# Patient Record
Sex: MALE | Race: WHITE | Employment: FULL TIME | URBAN - METROPOLITAN AREA
[De-identification: names, ages, dates, MRNs, and addresses within clinical notes are randomized per-mention and may not be internally consistent; named-entity substitution may affect disease eponyms.]

---

## 2019-01-31 NOTE — PROGRESS NOTES
SUBJECTIVE:   CC: Tyler Gonzalez Sr. is an 49 year old male who presents for preventive health visit.     Healthy Habits:    Do you get at least three servings of calcium containing foods daily (dairy, green leafy vegetables, etc.)? yes    Amount of exercise or daily activities, outside of work: 0 day(s) per week    Problems taking medications regularly No    Medication side effects: No    Have you had an eye exam in the past two years? yes    Do you see a dentist twice per year? yes    Do you have sleep apnea, excessive snoring or daytime drowsiness?no      Patient recently moved from [place].  Currently living in [place] with his girlfriend.  Patient has multiple health issues including anxiety and feeling of panic.  Apparently he had some anal fistula surgery done which cause tremendous amount of pain and resulted in  some complication including abcess which made him a little more cautious and anxious about medical health issues.    He is currently taking Paxil 5 mg but sometimes he feels a little head congested.    He would like to see if there is any different medication can be used along with Paxil or apart from Paxil.  Chronic neck issues for the last 30 years.  He has seen pain clinic in the past was given pain medication but he has not taken them recently and he is not planning to take pain medications.  He has done some trigger injections  And steroid Injections which in the past has helped.  Overall his most anxiety is due to some complications of medical issues.  He gets nervous and anxious talking about his medical health.  Complains of deviated nasal septum which caused some nasal congestion.  He is planning to see ENT would like to get a referral.  Intermittent asthma which is slight worse in the last 2-3 weeks due to cold.  Using his albuterol more frequently.  No current drug use but in the past he has a history of cocaine use.  He has also used marijuana but not currently using any  "drugs.  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      Today's PHQ-2 Score:   PHQ-2 ( 1999 [employer]) 2/1/2019   Q1: Little interest or pleasure in doing things 1   Q2: Feeling down, depressed or hopeless 1   PHQ-2 Score 2       Abuse: Current or Past(Physical, Sexual or Emotional)- No  Do you feel safe in your environment? Yes    Social History     Tobacco Use     Smoking status: Current Every Day Smoker     Packs/day: 1.00     Years: 20.00     Pack years: 20.00     Types: Cigarettes     Smokeless tobacco: Never Used   Substance Use Topics     Alcohol use: Yes     Frequency: 2-4 times a month     If you drink alcohol do you typically have >3 drinks per day or >7 drinks per week? No                      Last PSA: No results found for: PSA    Reviewed orders with patient. Reviewed health maintenance and updated orders accordingly - No      Reviewed and updated as needed this visit by clinical staff  Tobacco  Allergies  Meds  Fam Hx  Soc Hx        Reviewed and updated as needed this visit by Provider            ROS:  CONSTITUTIONAL: NEGATIVE for fever, chills, change in weight  INTEGUMENTARY/SKIN: NEGATIVE for worrisome rashes, moles or lesions  EYES: NEGATIVE for vision changes or irritation  ENT: NEGATIVE for ear, mouth and throat problems  RESP: NEGATIVE for significant cough or SOB  CV: NEGATIVE for chest pain, palpitations or peripheral edema  GI: NEGATIVE for nausea, abdominal pain, heartburn, or change in bowel habits   male: negative for dysuria, hematuria, decreased urinary stream, erectile dysfunction, urethral discharge  MUSCULOSKELETAL:NEGATIVE for significant arthralgias or myalgia and POSITIVE  for arthralgias neck  NEURO: NEGATIVE for weakness, dizziness or paresthesias  PSYCHIATRIC: HX anxiety    OBJECTIVE:   /70   Pulse 80   Temp 97  F (36.1  C) (Tympanic)   Ht 1.865 m (6' 1.43\")   Wt 94.8 kg (209 lb)   BMI 27.26 kg/m    EXAM:  GENERAL: healthy, alert and no " distress  EYES: Eyes grossly normal to inspection, PERRL and conjunctivae and sclerae normal  HENT: ear canals and TM's normal, nose and mouth without ulcers or lesions  NECK: no adenopathy, no asymmetry, masses, or scars and thyroid normal to palpation  RESP: lungs clear to auscultation - no rales, rhonchi or wheezes  CV: regular rate and rhythm, normal S1 S2, no S3 or S4, no murmur, click or rub, no peripheral edema and peripheral pulses strong  ABDOMEN: soft, nontender, no hepatosplenomegaly, no masses and bowel sounds normal  MS: no gross musculoskeletal defects noted, no edema  SKIN: no suspicious lesions or rashes  NEURO: Normal strength and tone, mentation intact and speech normal  PSYCH: mentation appears normal, affect normal/bright  PSYCH: mentation appears normal, affect normal/bright and appears anxious        ASSESSMENT/PLAN:   1. Encounter for annual physical exam  Chronic neck issues were discussed briefly today.  I would suggest to follow-up and will make any recommendations including physical therapy versus pain clinic for known pain medication modalities which can be used.  He has some success with trigger point injections.  - HIV Screening  - Lipid panel reflex to direct LDL Fasting  - GASTROENTEROLOGY ADULT REF PROCEDURE ONLY [facility] [phone]; No Provider Preference  - Comprehensive metabolic panel  - Prostate spec antigen screen    2. Screen for colon cancer    - [facility]OLOG[facility] ADULT [facility]F [facility]ED[facility] ONLY [facility] [phone]; No Provider Preference    3. Screening for prostate cancer    - Prostate spec antigen screen    4. Screening for HIV (human immunodeficiency virus)    - HIV Screening    5. Hyperlipidemia LDL goal <160    - Lipid panel reflex to direct LDL Fasting  - Comprehensive metabolic panel    6. Mild intermittent asthma without complication  Asthma control is not well at this time.  I suggested to use albuterol on a regular basis if this continues we  "need to discuss adding steroid inhaler.  - albuterol (PROAIR HFA/PROVENTIL HFA/VENTOLIN HFA) 108 (90 Base) MCG/ACT inhaler; Inhale 2 puffs into the lungs as needed for shortness of breath / dyspnea or wheezing  Dispense: 1 Inhaler; Refill: 8    7. Deviated nasal septum  Complains of nasal congestion and history of deviated nasal septum.  I would suggest to see ENT for evaluation.  - OTOLARYNGOLOGY REFERRAL    8. DENISHA (generalized anxiety disorder)  Patient has severe generalized anxiety issues along with fever of medical issues.  He does not like Paxil however he is taking very low-dose.  I discussed he can take Paxil every other day for a week and start taking Wellbutrin during that time.  We will see if the Wellbutrin helps.  Follow-up in 6 weeks for recheck  - PARoxetine (PAXIL) 10 MG tablet; Take 0.5 tablets (5 mg) by mouth daily  Dispense: 30 tablet; Refill: 0  - buPROPion (WELLBUTRIN XL) 150 MG 24 hr tablet; Take 1 tablet (150 mg) by mouth every morning  Dispense: 30 tablet; Refill: 3    COUNSELING:  Reviewed preventive health counseling, as reflected in patient instructions       Regular exercise       Healthy diet/nutrition    BP Readings from Last 1 Encounters:   02/01/19 110/70     Estimated body mass index is 27.26 kg/m  as calculated from the following:    Height as of this encounter: 1.865 m (6' 1.43\").    Weight as of this encounter: 94.8 kg (209 lb).           reports that he has been smoking cigarettes.  He has a 20.00 pack-year smoking history. he has never used smokeless tobacco.      Counseling Resources:  ATP IV Guidelines  Pooled Cohorts Equation Calculator  FRAX Risk Assessment  ICSI Preventive Guidelines  Dietary Guidelines for Americans, 2010  USDA's MyPlate  ASA Prophylaxis  Lung CA Screening    Boubacar Matias MD  [facility]  "

## 2019-02-01 ENCOUNTER — OFFICE VISIT (OUTPATIENT)
Dept: FAMILY MEDICINE | Facility: CLINIC | Age: 49
End: 2019-02-01
Payer: COMMERCIAL

## 2019-02-01 VITALS
HEART RATE: 80 BPM | BODY MASS INDEX: 27.7 KG/M2 | WEIGHT: 209 LBS | SYSTOLIC BLOOD PRESSURE: 110 MMHG | TEMPERATURE: 97 F | DIASTOLIC BLOOD PRESSURE: 70 MMHG | HEIGHT: 73 IN

## 2019-02-01 DIAGNOSIS — G89.29 CHRONIC NECK PAIN: ICD-10-CM

## 2019-02-01 DIAGNOSIS — F41.1 GAD (GENERALIZED ANXIETY DISORDER): ICD-10-CM

## 2019-02-01 DIAGNOSIS — M54.2 CHRONIC NECK PAIN: ICD-10-CM

## 2019-02-01 DIAGNOSIS — Z12.5 SCREENING FOR PROSTATE CANCER: ICD-10-CM

## 2019-02-01 DIAGNOSIS — Z12.11 SCREEN FOR COLON CANCER: ICD-10-CM

## 2019-02-01 DIAGNOSIS — J34.2 DEVIATED NASAL SEPTUM: ICD-10-CM

## 2019-02-01 DIAGNOSIS — E78.5 HYPERLIPIDEMIA LDL GOAL <160: ICD-10-CM

## 2019-02-01 DIAGNOSIS — Z00.00 ENCOUNTER FOR ANNUAL PHYSICAL EXAM: Primary | ICD-10-CM

## 2019-02-01 DIAGNOSIS — J45.20 MILD INTERMITTENT ASTHMA WITHOUT COMPLICATION: ICD-10-CM

## 2019-02-01 DIAGNOSIS — Z11.4 SCREENING FOR HIV (HUMAN IMMUNODEFICIENCY VIRUS): ICD-10-CM

## 2019-02-01 LAB
ALBUMIN SERPL-MCNC: 3.8 G/DL (ref 3.4–5)
ALP SERPL-CCNC: 56 U/L (ref 40–150)
ALT SERPL W P-5'-P-CCNC: 31 U/L (ref 0–70)
ANION GAP SERPL CALCULATED.3IONS-SCNC: 6 MMOL/L (ref 3–14)
AST SERPL W P-5'-P-CCNC: 23 U/L (ref 0–45)
BILIRUB SERPL-MCNC: 0.4 MG/DL (ref 0.2–1.3)
BUN SERPL-MCNC: 15 MG/DL (ref 7–30)
CALCIUM SERPL-MCNC: 9 MG/DL (ref 8.5–10.1)
CHLORIDE SERPL-SCNC: 105 MMOL/L (ref 94–109)
CHOLEST SERPL-MCNC: 169 MG/DL
CO2 SERPL-SCNC: 26 MMOL/L (ref 20–32)
CREAT SERPL-MCNC: 0.93 MG/DL (ref 0.66–1.25)
GFR SERPL CREATININE-BSD FRML MDRD: >90 ML/MIN/{1.73_M2}
GLUCOSE SERPL-MCNC: 96 MG/DL (ref 70–99)
HDLC SERPL-MCNC: 32 MG/DL
HIV 1+2 AB+HIV1 P24 AG SERPL QL IA: NONREACTIVE
LDLC SERPL CALC-MCNC: 114 MG/DL
NONHDLC SERPL-MCNC: 137 MG/DL
POTASSIUM SERPL-SCNC: 4.3 MMOL/L (ref 3.4–5.3)
PROT SERPL-MCNC: 7.3 G/DL (ref 6.8–8.8)
PSA SERPL-ACNC: 0.43 UG/L (ref 0–4)
SODIUM SERPL-SCNC: 137 MMOL/L (ref 133–144)
TRIGL SERPL-MCNC: 114 MG/DL

## 2019-02-01 PROCEDURE — 87389 HIV-1 AG W/HIV-1&-2 AB AG IA: CPT | Performed by: FAMILY MEDICINE

## 2019-02-01 PROCEDURE — 36415 COLL VENOUS BLD VENIPUNCTURE: CPT | Performed by: FAMILY MEDICINE

## 2019-02-01 PROCEDURE — 99386 PREV VISIT NEW AGE 40-64: CPT | Performed by: FAMILY MEDICINE

## 2019-02-01 PROCEDURE — 80061 LIPID PANEL: CPT | Performed by: FAMILY MEDICINE

## 2019-02-01 PROCEDURE — 99213 OFFICE O/P EST LOW 20 MIN: CPT | Mod: 25 | Performed by: FAMILY MEDICINE

## 2019-02-01 PROCEDURE — G0103 PSA SCREENING: HCPCS | Performed by: FAMILY MEDICINE

## 2019-02-01 PROCEDURE — 80053 COMPREHEN METABOLIC PANEL: CPT | Performed by: FAMILY MEDICINE

## 2019-02-01 RX ORDER — ALBUTEROL SULFATE 90 UG/1
2 AEROSOL, METERED RESPIRATORY (INHALATION) PRN
COMMUNITY
End: 2019-02-01

## 2019-02-01 RX ORDER — BUPROPION HYDROCHLORIDE 150 MG/1
150 TABLET ORAL EVERY MORNING
Qty: 30 TABLET | Refills: 3 | Status: ON HOLD | OUTPATIENT
Start: 2019-02-01 | End: 2019-02-28

## 2019-02-01 RX ORDER — ALBUTEROL SULFATE 90 UG/1
2 AEROSOL, METERED RESPIRATORY (INHALATION) PRN
Qty: 1 INHALER | Refills: 8 | Status: SHIPPED | OUTPATIENT
Start: 2019-02-01 | End: 2020-03-16

## 2019-02-01 RX ORDER — PAROXETINE 10 MG/1
5 TABLET, FILM COATED ORAL DAILY
COMMUNITY
End: 2019-02-01

## 2019-02-01 RX ORDER — PAROXETINE 10 MG/1
5 TABLET, FILM COATED ORAL DAILY
Qty: 30 TABLET | Refills: 0 | Status: SHIPPED | OUTPATIENT
Start: 2019-02-01 | End: 2019-03-15

## 2019-02-01 SDOH — HEALTH STABILITY: MENTAL HEALTH: HOW OFTEN DO YOU HAVE A DRINK CONTAINING ALCOHOL?: 2-4 TIMES A MONTH

## 2019-02-01 ASSESSMENT — ANXIETY QUESTIONNAIRES
IF YOU CHECKED OFF ANY PROBLEMS ON THIS QUESTIONNAIRE, HOW DIFFICULT HAVE THESE PROBLEMS MADE IT FOR YOU TO DO YOUR WORK, TAKE CARE OF THINGS AT HOME, OR GET ALONG WITH OTHER PEOPLE: SOMEWHAT DIFFICULT
3. WORRYING TOO MUCH ABOUT DIFFERENT THINGS: SEVERAL DAYS
6. BECOMING EASILY ANNOYED OR IRRITABLE: SEVERAL DAYS
1. FEELING NERVOUS, ANXIOUS, OR ON EDGE: SEVERAL DAYS
2. NOT BEING ABLE TO STOP OR CONTROL WORRYING: SEVERAL DAYS
GAD7 TOTAL SCORE: 7
7. FEELING AFRAID AS IF SOMETHING AWFUL MIGHT HAPPEN: SEVERAL DAYS
5. BEING SO RESTLESS THAT IT IS HARD TO SIT STILL: SEVERAL DAYS

## 2019-02-01 ASSESSMENT — MIFFLIN-ST. JEOR: SCORE: 1868.64

## 2019-02-01 ASSESSMENT — PATIENT HEALTH QUESTIONNAIRE - PHQ9
5. POOR APPETITE OR OVEREATING: SEVERAL DAYS
SUM OF ALL RESPONSES TO PHQ QUESTIONS 1-9: 7

## 2019-02-01 NOTE — LETTER
February 6, 2019      Tyler Bayshay Sr.  [address] [address] [address]        Dear ,      I have reviewed your recent labs. Here are the results:     -PSA (prostate specific antigen) test is normal.  This indicates a low likelihood of prostate cancer.  ADVISE: rechecking this in 1 year.   -Liver and gallbladder tests are normal (ALT,AST, Alk phos, bilirubin), kidney function is normal (Cr, GFR), sodium is normal, potassium is normal, calcium is normal, glucose is normal.   -HIV test is normal.   -Cholesterol levels indicate mildly elevated LDL which is bad cholesterol and slightly low HDL which is good cholesterol.  Please work on your diet and do regular exercise.  At this time I would not suggest any additional medication.  Will work on diet and exercise and follow-up in 1 year for recheck.     Resulted Orders   HIV Screening   Result Value Ref Range    HIV Antigen Antibody Combo Nonreactive NR^Nonreactive          Comment:      HIV-1 p24 Ag & HIV-1/HIV-2 Ab Not Detected   Lipid panel reflex to direct LDL Fasting   Result Value Ref Range    Cholesterol 169 <200 mg/dL    Triglycerides 114 <150 mg/dL      Comment:      Fasting specimen    HDL Cholesterol 32 (L) >39 mg/dL    LDL Cholesterol Calculated 114 (H) <100 mg/dL      Comment:      Above desirable:  100-129 mg/dl  Borderline High:  130-159 mg/dL  High:             160-189 mg/dL  Very high:       >189 mg/dl      Non HDL Cholesterol 137 (H) <130 mg/dL      Comment:      Above Desirable:  130-159 mg/dl  Borderline high:  160-189 mg/dl  High:             190-219 mg/dl  Very high:       >219 mg/dl     Comprehensive metabolic panel   Result Value Ref Range    Sodium 137 133 - 144 mmol/L    Potassium 4.3 3.4 - 5.3 mmol/L    Chloride 105 94 - 109 mmol/L    Carbon Dioxide 26 20 - 32 mmol/L    Anion Gap 6 3 - 14 mmol/L    Glucose 96 70 - 99 mg/dL      Comment:      Fasting specimen    Urea Nitrogen 15 7 - 30 mg/dL    Creatinine 0.93 0.66 -  1.25 mg/dL    GFR Estimate >90 >60 mL/min/[1.73_m2]      Comment:      Non  GFR Calc  Starting 12/18/2018, serum creatinine based estimated GFR (eGFR) will be   calculated using the Chronic Kidney Disease Epidemiology Collaboration   (CKD-EPI) equation.      GFR Estimate If Black >90 >60 mL/min/[1.73_m2]      Comment:       GFR Calc  Starting 12/18/2018, serum creatinine based estimated GFR (eGFR) will be   calculated using the Chronic Kidney Disease Epidemiology Collaboration   (CKD-EPI) equation.      Calcium 9.0 8.5 - 10.1 mg/dL    Bilirubin Total 0.4 0.2 - 1.3 mg/dL    Albumin 3.8 3.4 - 5.0 g/dL    Protein Total 7.3 6.8 - 8.8 g/dL    Alkaline Phosphatase 56 40 - 150 U/L    ALT 31 0 - 70 U/L    AST 23 0 - 45 U/L   Prostate spec antigen screen   Result Value Ref Range    PSA 0.43 0 - 4 ug/L      Comment:      Assay Method:  Chemiluminescence using Siemens Vista analyzer       If you have any questions or concerns, please call the clinic at the number listed above.       Sincerely,        Boubacar Matias MD

## 2019-02-02 ASSESSMENT — ASTHMA QUESTIONNAIRES: ACT_TOTALSCORE: 13

## 2019-02-02 ASSESSMENT — ANXIETY QUESTIONNAIRES: GAD7 TOTAL SCORE: 7

## 2019-02-07 ENCOUNTER — HOSPITAL ENCOUNTER (OUTPATIENT)
Facility: CLINIC | Age: 49
End: 2019-02-07
Attending: SPECIALIST | Admitting: SPECIALIST
Payer: COMMERCIAL

## 2019-02-28 ENCOUNTER — HOSPITAL ENCOUNTER (OUTPATIENT)
Facility: CLINIC | Age: 49
Discharge: HOME | End: 2019-02-28
Attending: INTERNAL MEDICINE | Admitting: INTERNAL MEDICINE
Payer: COMMERCIAL

## 2019-02-28 VITALS
OXYGEN SATURATION: 95 % | HEIGHT: 73 IN | HEART RATE: 59 BPM | DIASTOLIC BLOOD PRESSURE: 82 MMHG | WEIGHT: 209 LBS | BODY MASS INDEX: 27.7 KG/M2 | SYSTOLIC BLOOD PRESSURE: 123 MMHG | RESPIRATION RATE: 18 BRPM

## 2019-02-28 LAB — COLONOSCOPY: NORMAL

## 2019-02-28 PROCEDURE — 88305 TISSUE EXAM BY PATHOLOGIST: CPT | Performed by: INTERNAL MEDICINE

## 2019-02-28 PROCEDURE — G0500 MOD SEDAT ENDO SERVICE >5YRS: HCPCS | Performed by: INTERNAL MEDICINE

## 2019-02-28 PROCEDURE — 88305 TISSUE EXAM BY PATHOLOGIST: CPT | Mod: 26 | Performed by: INTERNAL MEDICINE

## 2019-02-28 PROCEDURE — 45380 COLONOSCOPY AND BIOPSY: CPT | Performed by: INTERNAL MEDICINE

## 2019-02-28 PROCEDURE — 25000128 H RX IP 250 OP 636: Performed by: INTERNAL MEDICINE

## 2019-02-28 RX ORDER — ONDANSETRON 2 MG/ML
4 INJECTION INTRAMUSCULAR; INTRAVENOUS EVERY 6 HOURS PRN
Status: DISCONTINUED | OUTPATIENT
Start: 2019-02-28 | End: 2019-02-28 | Stop reason: HOSPADM

## 2019-02-28 RX ORDER — ONDANSETRON 4 MG/1
4 TABLET, ORALLY DISINTEGRATING ORAL EVERY 6 HOURS PRN
Status: DISCONTINUED | OUTPATIENT
Start: 2019-02-28 | End: 2019-02-28 | Stop reason: HOSPADM

## 2019-02-28 RX ORDER — LIDOCAINE 40 MG/G
CREAM TOPICAL
Status: DISCONTINUED | OUTPATIENT
Start: 2019-02-28 | End: 2019-02-28 | Stop reason: HOSPADM

## 2019-02-28 RX ORDER — FLUMAZENIL 0.1 MG/ML
0.2 INJECTION, SOLUTION INTRAVENOUS
Status: DISCONTINUED | OUTPATIENT
Start: 2019-02-28 | End: 2019-02-28 | Stop reason: HOSPADM

## 2019-02-28 RX ORDER — NALOXONE HYDROCHLORIDE 0.4 MG/ML
.1-.4 INJECTION, SOLUTION INTRAMUSCULAR; INTRAVENOUS; SUBCUTANEOUS
Status: DISCONTINUED | OUTPATIENT
Start: 2019-02-28 | End: 2019-02-28 | Stop reason: HOSPADM

## 2019-02-28 RX ORDER — ONDANSETRON 2 MG/ML
4 INJECTION INTRAMUSCULAR; INTRAVENOUS
Status: DISCONTINUED | OUTPATIENT
Start: 2019-02-28 | End: 2019-02-28 | Stop reason: HOSPADM

## 2019-02-28 RX ORDER — FENTANYL CITRATE 50 UG/ML
INJECTION, SOLUTION INTRAMUSCULAR; INTRAVENOUS PRN
Status: DISCONTINUED | OUTPATIENT
Start: 2019-02-28 | End: 2019-02-28 | Stop reason: HOSPADM

## 2019-02-28 ASSESSMENT — MIFFLIN-ST. JEOR: SCORE: 1861.9

## 2019-03-01 LAB — COPATH REPORT: NORMAL

## 2019-03-07 ENCOUNTER — TELEPHONE (OUTPATIENT)
Dept: FAMILY MEDICINE | Facility: CLINIC | Age: 49
End: 2019-03-07

## 2019-03-07 NOTE — LETTER
March 7, 2019      Tyler Gonzalez Sr.  [address]  [place] [address]        Dear Tyler,    I care about your health and have reviewed your health plan. I have reviewed your medical conditions, medication list, and lab results and am making recommendations based on this review, to better manage your health.    You are in particular need of attention regarding:  -Asthma    I am recommending that you:   -Complete and return the attached ASTHMA CONTROL TEST.  If your total score is 19 or less or you have been to the ER or urgent care for your asthma, then please schedule an asthma followup appointment.    Here is a list of Health Maintenance topics that are due now or due soon:  Health Maintenance Due   Topic Date Due     Asthma Action Plan - yearly  01/23/1974     Flu Vaccine (1) 09/01/2018     Zoster (Shingles) Vaccine (1 of 2) 01/23/2019       Please call us at [phone] (or use Cubicle) to address the above recommendations.     Thank you for trusting [facility] and we appreciate the opportunity to serve you.  We look forward to supporting your healthcare needs in the future.    Healthy Regards,    Boubacar Matias MD/Jamari JOHNSON CMA

## 2019-03-07 NOTE — TELEPHONE ENCOUNTER
Needs of attention regarding:  -Asthma    Health Maintenance Topics with due status: Overdue       Topic Date Due    ASTHMA ACTION PLAN Q1 YR 01/23/1974    INFLUENZA VACCINE 09/01/2018    ZOSTER IMMUNIZATION 01/23/2019       Communication:  See Letter, ACT mailed to Gopi JOHNSON CMA

## 2019-03-15 ENCOUNTER — OFFICE VISIT (OUTPATIENT)
Dept: FAMILY MEDICINE | Facility: CLINIC | Age: 49
End: 2019-03-15
Payer: COMMERCIAL

## 2019-03-15 VITALS
BODY MASS INDEX: 28.37 KG/M2 | HEART RATE: 68 BPM | WEIGHT: 215 LBS | TEMPERATURE: 97.4 F | DIASTOLIC BLOOD PRESSURE: 70 MMHG | SYSTOLIC BLOOD PRESSURE: 100 MMHG

## 2019-03-15 DIAGNOSIS — J45.20 MILD INTERMITTENT ASTHMA WITHOUT COMPLICATION: ICD-10-CM

## 2019-03-15 DIAGNOSIS — F41.1 GAD (GENERALIZED ANXIETY DISORDER): ICD-10-CM

## 2019-03-15 DIAGNOSIS — J34.2 DEVIATED NASAL SEPTUM: ICD-10-CM

## 2019-03-15 DIAGNOSIS — Z01.818 PREOP GENERAL PHYSICAL EXAM: Primary | ICD-10-CM

## 2019-03-15 PROCEDURE — 99214 OFFICE O/P EST MOD 30 MIN: CPT | Performed by: INTERNAL MEDICINE

## 2019-03-15 RX ORDER — PAROXETINE 10 MG/1
10 TABLET, FILM COATED ORAL DAILY
Qty: 90 TABLET | Refills: 1 | Status: SHIPPED | OUTPATIENT
Start: 2019-03-15 | End: 2019-09-19

## 2019-03-15 ASSESSMENT — PATIENT HEALTH QUESTIONNAIRE - PHQ9
SUM OF ALL RESPONSES TO PHQ QUESTIONS 1-9: 7
5. POOR APPETITE OR OVEREATING: SEVERAL DAYS

## 2019-03-15 ASSESSMENT — ANXIETY QUESTIONNAIRES
7. FEELING AFRAID AS IF SOMETHING AWFUL MIGHT HAPPEN: SEVERAL DAYS
IF YOU CHECKED OFF ANY PROBLEMS ON THIS QUESTIONNAIRE, HOW DIFFICULT HAVE THESE PROBLEMS MADE IT FOR YOU TO DO YOUR WORK, TAKE CARE OF THINGS AT HOME, OR GET ALONG WITH OTHER PEOPLE: SOMEWHAT DIFFICULT
2. NOT BEING ABLE TO STOP OR CONTROL WORRYING: SEVERAL DAYS
6. BECOMING EASILY ANNOYED OR IRRITABLE: SEVERAL DAYS
3. WORRYING TOO MUCH ABOUT DIFFERENT THINGS: SEVERAL DAYS
1. FEELING NERVOUS, ANXIOUS, OR ON EDGE: SEVERAL DAYS
GAD7 TOTAL SCORE: 7
5. BEING SO RESTLESS THAT IT IS HARD TO SIT STILL: SEVERAL DAYS
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
## 2019-03-16 ASSESSMENT — ANXIETY QUESTIONNAIRES: GAD7 TOTAL SCORE: 7

## 2019-03-16 ASSESSMENT — ASTHMA QUESTIONNAIRES: ACT_TOTALSCORE: 16

## 2019-03-18 NOTE — PROGRESS NOTES
Pre-op faxed to [facility]  Fax number for surgical facility: [phone].  MF

## 2019-06-20 ENCOUNTER — TELEPHONE (OUTPATIENT)
Dept: FAMILY MEDICINE | Facility: CLINIC | Age: 49
End: 2019-06-20

## 2019-06-20 NOTE — TELEPHONE ENCOUNTER
Needs of attention regarding:  -Asthma    Health Maintenance Topics with due status: Overdue       Topic Date Due    ASTHMA ACTION PLAN 1970    ASTHMA CONTROL TEST 1970     Health Maintenance Topics with due status: Due On       Topic Date Due    ZOSTER IMMUNIZATION 01/23/2019       Communication:  See Letter, ACT mailed to Gopi JOHNSON CMA

## 2019-06-20 NOTE — LETTER
June 20, 2019      Tyler Gonzalez Sr.  [address]  [place] [address]        Dear Tyler,    I care about your health and have reviewed your health plan. I have reviewed your medical conditions, medication list, and lab results and am making recommendations based on this review, to better manage your health.    You are in particular need of attention regarding:  -Asthma    I am recommending that you:   -Complete and return the attached ASTHMA CONTROL TEST.  If your total score is 19 or less or you have been to the ER or urgent care for your asthma, then please schedule an asthma followup appointment.    Please call us at [phone] if you have any questions.    Thank you for trusting [facility] and we appreciate the opportunity to serve you.  We look forward to supporting your healthcare needs in the future.    Healthy Regards,    Boubacar Matias MD

## 2019-09-19 DIAGNOSIS — F41.1 GAD (GENERALIZED ANXIETY DISORDER): ICD-10-CM

## 2019-09-20 RX ORDER — PAROXETINE 10 MG/1
TABLET, FILM COATED ORAL
Qty: 90 TABLET | Refills: 1 | Status: SHIPPED | OUTPATIENT
Start: 2019-09-20 | End: 2020-02-03

## 2019-09-20 NOTE — TELEPHONE ENCOUNTER
"Requested Prescriptions   Pending Prescriptions Disp Refills     PARoxetine (PAXIL) 10 MG tablet [Pharmacy Med Name: PAROXETINE HCL 10 MG TABLET] 90 tablet 0     Sig: TAKE 1 TABLET BY MOUTH EVERY DAY       SSRIs Protocol Passed - 9/19/2019 11:47 PM        Passed - Recent (12 mo) or future (30 days) visit within the authorizing provider's specialty     Patient had office visit in the last 12 months or has a visit in the next 30 days with authorizing provider or within the authorizing provider's specialty.  See \"Patient Info\" tab in inbasket, or \"Choose Columns\" in Meds & Orders section of the refill encounter.              Passed - Medication is active on med list        Passed - Patient is age 18 or older        PHQ-9 SCORE 2/1/2019 3/15/2019   PHQ-9 Total Score 7 7     Last Written Prescription Date:  03/15/2019  Last Fill Quantity: 90,  # refills: 1   Last office visit: 3/15/2019 with prescribing provider:  yes   Future Office Visit:      "

## 2020-02-03 ENCOUNTER — OFFICE VISIT (OUTPATIENT)
Dept: FAMILY MEDICINE | Facility: CLINIC | Age: 50
End: 2020-02-03
Payer: COMMERCIAL

## 2020-02-03 VITALS
OXYGEN SATURATION: 96 % | TEMPERATURE: 96.9 F | WEIGHT: 213 LBS | HEIGHT: 73 IN | BODY MASS INDEX: 28.23 KG/M2 | DIASTOLIC BLOOD PRESSURE: 82 MMHG | SYSTOLIC BLOOD PRESSURE: 104 MMHG | HEART RATE: 100 BPM

## 2020-02-03 DIAGNOSIS — Z12.5 SCREENING FOR PROSTATE CANCER: ICD-10-CM

## 2020-02-03 DIAGNOSIS — T78.1XXD ADVERSE FOOD REACTION, SUBSEQUENT ENCOUNTER: Primary | ICD-10-CM

## 2020-02-03 DIAGNOSIS — K90.41 GLUTEN INTOLERANCE: ICD-10-CM

## 2020-02-03 DIAGNOSIS — J45.20 MILD INTERMITTENT ASTHMA WITHOUT COMPLICATION: ICD-10-CM

## 2020-02-03 DIAGNOSIS — Z91.09 ENVIRONMENTAL ALLERGIES: ICD-10-CM

## 2020-02-03 DIAGNOSIS — R10.13 EPIGASTRIC PAIN: ICD-10-CM

## 2020-02-03 PROCEDURE — 86003 ALLG SPEC IGE CRUDE XTRC EA: CPT | Performed by: INTERNAL MEDICINE

## 2020-02-03 PROCEDURE — 36415 COLL VENOUS BLD VENIPUNCTURE: CPT | Performed by: INTERNAL MEDICINE

## 2020-02-03 PROCEDURE — G0103 PSA SCREENING: HCPCS | Performed by: INTERNAL MEDICINE

## 2020-02-03 PROCEDURE — 82785 ASSAY OF IGE: CPT | Performed by: INTERNAL MEDICINE

## 2020-02-03 PROCEDURE — 99214 OFFICE O/P EST MOD 30 MIN: CPT | Performed by: INTERNAL MEDICINE

## 2020-02-03 RX ORDER — PANTOPRAZOLE SODIUM 40 MG/1
40 TABLET, DELAYED RELEASE ORAL 2 TIMES DAILY
COMMUNITY

## 2020-02-03 ASSESSMENT — ASTHMA QUESTIONNAIRES
QUESTION_2 LAST FOUR WEEKS HOW OFTEN HAVE YOU HAD SHORTNESS OF BREATH: MORE THAN ONCE A DAY
ACUTE_EXACERBATION_TODAY: NO
ACT_TOTALSCORE: 12
QUESTION_4 LAST FOUR WEEKS HOW OFTEN HAVE YOU USED YOUR RESCUE INHALER OR NEBULIZER MEDICATION (SUCH AS ALBUTEROL): ONE OR TWO TIMES PER DAY
QUESTION_3 LAST FOUR WEEKS HOW OFTEN DID YOUR ASTHMA SYMPTOMS (WHEEZING, COUGHING, SHORTNESS OF BREATH, CHEST TIGHTNESS OR PAIN) WAKE YOU UP AT NIGHT OR EARLIER THAN USUAL IN THE MORNING: FOUR OR MORE NIGHTS A WEEK
QUESTION_5 LAST FOUR WEEKS HOW WOULD YOU RATE YOUR ASTHMA CONTROL: SOMEWHAT CONTROLLED
QUESTION_1 LAST FOUR WEEKS HOW MUCH OF THE TIME DID YOUR ASTHMA KEEP YOU FROM GETTING AS MUCH DONE AT WORK, SCHOOL OR AT HOME: NONE OF THE TIME

## 2020-02-03 ASSESSMENT — MIFFLIN-ST. JEOR: SCORE: 1871.07

## 2020-02-03 NOTE — PROGRESS NOTES
"Subjective    Tyler Gonzalez Sr. is a 50 year old male who presents to clinic today because of:  Allergy testing, gluten intolerance,  Wants blood work for psa- al levels- has lots of questions  HPI     Gaetano is here with a number of concerns today.  The history is a little difficult to follow.  The main concern is he is trying to eliminate reasons why he may be feeling so poorly.  He was prescribed Paxil for anxiety, also had been diagnosed with PTSD but he is not sure if that is accurate.  He had had a lot of side effects including nausea, brain from a, blurry vision.  Reports he has felt \"constantly sick\" for the past 3 years.  He was back down in [place] for a while recently.  He was seen in the ER there at one point for chest pain and vomiting and heartburn.  His blood pressure was quite elevated at the ER visit, systolics in the 180s.  He had cardiac testing in the ER which was normal, but was referred to a cardiologist for the HTN.  He had a stress test and his blood pressure there was normal, the stress test was normal.      A couple times after eating spaghetti with tomato sauce, he had quite a bit of vomiting and shaking chills.  He is not sure if he has a tomato allergy, has avoided it since then.  Also wondered about a gluten allergy or intolerance.  He has been following a gluten-free diet for the past 7 weeks and does feel quite a bit better.  He also stopped his Paxil.  He noticed a big improvement in his brain fog.  He still has abdominal bloating.    He is wondering about food and environmental allergies.  Had allergy testing in [place] and was allergic to a number of outdoor allergens as well as cats and dogs.  He is wondering if the testing covered all the trees and plants that might be in [place] or just the ones in [place].  Also confused because he was told he has asthma, has been on inhalers for years, but at some point had breathing test and was told he did not have asthma.  He reports " "intermittent vertigo, had this a couple times with driving.  Not sure if it is related to eating gluten?.  Has a history of epilepsy, this resolved at age 14.  Anytime he feels dizzy he is worried he might be at risk of having a seizure.    Wondering about his prostate.  Told it was enlarged based on an imaging study of his abdomen.  But has had normal PSA tests.    Always had prominent gag reflex. Thought there was a spot in the back of his throat. Throat feels tight sometimes.     He has a lot of physical symptoms.  Has been told for different illnesses that there was nothing wrong, he gets very anxious with the uncertainty.  Has been doing some research about what might be causing his symptoms, would like to start with some allergy test to make sure it is not an allergy.        Review of Systems  Const, HEENT, cv, pulm, gi, psych reviewed,  otherwise negative unless noted above.      Problem List  Patient Active Problem List    Diagnosis Date Noted     Mild intermittent asthma without complication 02/01/2019     Priority: Medium     Chronic neck pain 02/01/2019     Priority: Medium     DENISHA (generalized anxiety disorder) 02/01/2019     Priority: Medium      Medications  albuterol (PROAIR HFA/PROVENTIL HFA/VENTOLIN HFA) 108 (90 Base) MCG/ACT inhaler, Inhale 2 puffs into the lungs as needed for shortness of breath / dyspnea or wheezing  pantoprazole (PROTONIX) 40 MG EC tablet, Take 40 mg by mouth daily    No current facility-administered medications on file prior to visit.     Allergies  Allergies   Allergen Reactions     Penicillins Unknown     Reviewed and updated as needed this visit by Provider           Objective    /82   Pulse 100   Temp 96.9  F (36.1  C) (Tympanic)   Ht 1.848 m (6' 0.75\")   Wt 96.6 kg (213 lb)   SpO2 96%   BMI 28.30 kg/m    Normalized weight-for-age data not available for patients older than 20 years.    Physical Exam  Gen: well appearing, pleasant man, no distress  HEENT: JHONNY, " "sclera nonicteric, oropharynx clear, no oral lesions, MMM  Neck: supple, no LAD  Pulm: breathing comfortably, CTAB, no wheezes or rales  CV: RRR, normal S1 and S2, no murmurs  Abd: BS present, soft, nontender, nondistended  Psych: anxious appearing, talks quickly           Assessment & Plan      ICD-10-CM    1. Adverse food reaction, subsequent encounter T78.1XXD Allergen tomato IgE   2. Gluten intolerance K90.41 GASTROENTEROLOGY ADULT REF CONSULT ONLY   3. Epigastric pain R10.13 GASTROENTEROLOGY ADULT REF CONSULT ONLY   4. Environmental allergies Z91.09 Allergy adult food panel     ALLERGY/ASTHMA ADULT REFERRAL   5. Mild intermittent asthma without complication J45.20 ALLERGY/ASTHMA ADULT REFERRAL   6. Screening for prostate cancer Z12.5 PSA, screen       Gaetano reported a lot of concerns today, but it seems like his main goal is to figure out his food allergies, environmental allergies. Also mentioned \"leaky gut.\"  Can run a basic food panel and tomatoes.  Will also refer to allergy for further testing and discussion of overlay with his asthma.  Recommended GI consult for his GI issues.  He is aware blood testing for celiac is inaccurate for diagnosis when on a gluten free diet.  He is not willing to add gluten back into his diet.  So will refer to GI.     Wants to check PSA today along with other blood work.     Follow Up  Return in about 2 months (around 4/3/2020) for Routine Visit. can reconvene after meeting with specialists.       Rosa Sharif MD        "

## 2020-02-03 NOTE — LETTER
February 5, 2020      Tyler Bayshay Sr.  [address]  [place] [address]        Dear ,    We are writing to inform you of your test results.    The testing for allergies that was done including tomatoes and all the other foods listed was negative.  So I would not worry about trying to avoid these foods.     The PSA (prostate cancer screening test) is normal.     Resulted Orders   Allergy adult food panel   Result Value Ref Range    IGE 18 0 - 114 KIU/L    Allergen [place] <0.10 <0.10 KU(A)/L      Comment:      Interpretation: None Detected    Allergen Cashew <0.10 <0.10 KU(A)/L      Comment:      Interpretation: None Detected    Allergen Fish(Cod) <0.10 <0.10 KU(A)/L      Comment:      Interpretation: None Detected    Allergen Egg White <0.10 <0.10 KU(A)/L      Comment:      Interpretation: None Detected    Allergen, Hazelnut <0.10 <0.10 KU(A)/L      Comment:      Interpretation: None Detected    Allergen Milk <0.10 <0.10 KU(A)/L      Comment:      Interpretation: None Detected    Allergen Peanut <0.10 <0.10 KU(A)/L      Comment:      Interpretation: None Detected    Allergen, [place] <0.10 <0.10 KU(A)/L      Comment:      Interpretation: None Detected    Allergen Scallop <0.10 <0.10 KU(A)/L      Comment:      Interpretation: None Detected    Allergen, Sesame Seed <0.10 <0.10 KU(A)/L      Comment:      Interpretation: None Detected    Allergen Shrimp <0.10 <0.10 KU(A)/L      Comment:      Interpretation: None Detected    Allergen Soybean IgE <0.10 <0.10 KU(A)/L      Comment:      Interpretation: None Detected    Allergen Tuna <0.10 <0.10 KU(A)/L      Comment:      Interpretation: None Detected    Allergen, [place] <0.10 <0.10 KU(A)/L      Comment:      Interpretation: None Detected    Allergen Wheat <0.10 <0.10 KU(A)/L      Comment:      Interpretation: None Detected   PSA, screen   Result Value Ref Range    PSA 0.58 0 - 4 ug/L      Comment:      Assay Method:  Chemiluminescence using Siemens Vista  analyzer   Allergen tomato IgE   Result Value Ref Range    Allergen Tomato <0.10 <0.10 KU(A)/L      Comment:      Interpretation: None Detected       If you have any questions or concerns, please call the clinic at the number listed above.       Sincerely,        Rosa Sharif MD

## 2020-02-04 LAB — PSA SERPL-ACNC: 0.58 UG/L (ref 0–4)

## 2020-02-04 ASSESSMENT — ASTHMA QUESTIONNAIRES: ACT_TOTALSCORE: 12

## 2020-02-05 LAB
ALMOND IGE QN: <0.1 KU(A)/L
CASHEW NUT IGE QN: <0.1 KU(A)/L
CODFISH IGE QN: <0.1 KU(A)/L
COW MILK IGE QN: <0.1 KU(A)/L
EGG WHITE IGE QN: <0.1 KU(A)/L
HAZELNUT IGE QN: <0.1 KU(A)/L
IGE SERPL-ACNC: 18 KIU/L (ref 0–114)
PEANUT IGE QN: <0.1 KU(A)/L
SALMON IGE QN: <0.1 KU(A)/L
SCALLOP IGE QN: <0.1 KU(A)/L
SESAME SEED IGE QN: <0.1 KU(A)/L
SHRIMP IGE QN: <0.1 KU(A)/L
SOYBEAN IGE QN: <0.1 KU(A)/L
TOMATO IGE QN: <0.1 KU(A)/L
TUNA IGE QN: <0.1 KU(A)/L
WALNUT IGE QN: <0.1 KU(A)/L
WHEAT IGE QN: <0.1 KU(A)/L

## 2020-02-06 ENCOUNTER — TELEPHONE (OUTPATIENT)
Dept: FAMILY MEDICINE | Facility: CLINIC | Age: 50
End: 2020-02-06

## 2020-02-06 NOTE — TELEPHONE ENCOUNTER
Reason for Call:  Request for results:    Name of test or procedure: Allergy food panel, PSA,  Allergen     Date of test of procedure: 02/03/20    Location of the test or procedure: [facility] to leave the result message on voice mail or with a family member? YES    Phone number Patient can be reached at:  Home number on file [phone] (home)    Additional comments: anytime     Call taken on 2/6/2020 at 11:01 AM by Anna Monae

## 2020-02-06 NOTE — TELEPHONE ENCOUNTER
Attempted to call patient back to inform him of Dr. Sharif's letter that was sent regarding results below. Left a non-detailed message and call back number [phone].     Dear Lisa,     We are writing to inform you of your test results.     The testing for allergies that was done including tomatoes and all the other foods listed was negative.  So I would not worry about trying to avoid these foods.      The PSA (prostate cancer screening test) is normal.       Araceli Loyola RN, BSN  [facility]

## 2020-02-06 NOTE — TELEPHONE ENCOUNTER
Patient returned call and was informed of results. Patient/ parent verbalized understanding and agrees with plan.    Sheryl Ramos RN   [facility] - Triage

## 2020-02-12 ENCOUNTER — TRANSFERRED RECORDS (OUTPATIENT)
Dept: HEALTH INFORMATION MANAGEMENT | Facility: CLINIC | Age: 50
End: 2020-02-12

## 2020-02-13 ENCOUNTER — TRANSFERRED RECORDS (OUTPATIENT)
Dept: HEALTH INFORMATION MANAGEMENT | Facility: CLINIC | Age: 50
End: 2020-02-13

## 2020-02-18 ENCOUNTER — OFFICE VISIT (OUTPATIENT)
Dept: FAMILY MEDICINE | Facility: CLINIC | Age: 50
End: 2020-02-18
Payer: COMMERCIAL

## 2020-02-18 VITALS
TEMPERATURE: 97.8 F | SYSTOLIC BLOOD PRESSURE: 112 MMHG | HEIGHT: 74 IN | OXYGEN SATURATION: 99 % | DIASTOLIC BLOOD PRESSURE: 62 MMHG | BODY MASS INDEX: 28.36 KG/M2 | WEIGHT: 221 LBS | HEART RATE: 88 BPM

## 2020-02-18 DIAGNOSIS — R79.89 ELEVATED TSH: ICD-10-CM

## 2020-02-18 DIAGNOSIS — E78.2 MIXED HYPERLIPIDEMIA: Primary | ICD-10-CM

## 2020-02-18 DIAGNOSIS — M54.2 NECK PAIN: ICD-10-CM

## 2020-02-18 DIAGNOSIS — R53.83 TIRED: ICD-10-CM

## 2020-02-18 DIAGNOSIS — R73.09 ELEVATED GLUCOSE: ICD-10-CM

## 2020-02-18 LAB
ERYTHROCYTE [DISTWIDTH] IN BLOOD BY AUTOMATED COUNT: 13.3 % (ref 10–15)
HCT VFR BLD AUTO: 46.2 % (ref 40–53)
HGB BLD-MCNC: 15.7 G/DL (ref 13.3–17.7)
MCH RBC QN AUTO: 31 PG (ref 26.5–33)
MCHC RBC AUTO-ENTMCNC: 34 G/DL (ref 31.5–36.5)
MCV RBC AUTO: 91 FL (ref 78–100)
PLATELET # BLD AUTO: 252 10E9/L (ref 150–450)
RBC # BLD AUTO: 5.07 10E12/L (ref 4.4–5.9)
WBC # BLD AUTO: 8.2 10E9/L (ref 4–11)

## 2020-02-18 PROCEDURE — 85027 COMPLETE CBC AUTOMATED: CPT | Performed by: FAMILY MEDICINE

## 2020-02-18 PROCEDURE — 80061 LIPID PANEL: CPT | Performed by: FAMILY MEDICINE

## 2020-02-18 PROCEDURE — 84443 ASSAY THYROID STIM HORMONE: CPT | Performed by: FAMILY MEDICINE

## 2020-02-18 PROCEDURE — 99214 OFFICE O/P EST MOD 30 MIN: CPT | Performed by: FAMILY MEDICINE

## 2020-02-18 PROCEDURE — 80053 COMPREHEN METABOLIC PANEL: CPT | Performed by: FAMILY MEDICINE

## 2020-02-18 PROCEDURE — 36415 COLL VENOUS BLD VENIPUNCTURE: CPT | Performed by: FAMILY MEDICINE

## 2020-02-18 ASSESSMENT — MIFFLIN-ST. JEOR: SCORE: 1927.2

## 2020-02-18 NOTE — PROGRESS NOTES
Subjective     Tyler Gonzalez Sr. is a 50 year old male who presents to clinic today for the following health issues:    HPI   Would like full blood work up per pt.  Asked for this last time also.    Would like a neurology referral     Patient Active Problem List   Diagnosis     Mild intermittent asthma without complication     Chronic neck pain     DENISHA (generalized anxiety disorder)     Past Surgical History:   Procedure Laterality Date     ANUS SURGERY      spinterectomy .complicated with abcess done in [place]     COLONOSCOPY N/A 2/28/2019    Procedure: COMBINED COLONOSCOPY, SINGLE OR MULTIPLE BIOPSY/POLYPECTOMY BY BIOPSY;  Surgeon: Calixto Nolen MD;  Location:  GI     ENT SURGERY       KNEE SURGERY      ACL surgery. 1999 k[facility]       Social History     Tobacco Use     Smoking status: Current Every Day Smoker     Packs/day: 1.00     Years: 20.00     Pack years: 20.00     Types: Cigarettes     Smokeless tobacco: Never Used   Substance Use Topics     Alcohol use: Yes     Frequency: 2-4 times a month     Family History   Problem Relation Age of Onset     Coronary Artery Disease Mother      Heart Disease Father          Current Outpatient Medications   Medication Sig Dispense Refill     albuterol (PROAIR HFA/PROVENTIL HFA/VENTOLIN HFA) 108 (90 Base) MCG/ACT inhaler Inhale 2 puffs into the lungs as needed for shortness of breath / dyspnea or wheezing 1 Inhaler 8     pantoprazole (PROTONIX) 40 MG EC tablet Take 40 mg by mouth 2 times daily        Allergies   Allergen Reactions     Penicillins Unknown     Recent Labs   Lab Test 02/01/19  0852   *   HDL 32*   TRIG 114   ALT 31   CR 0.93   GFRESTIMATED >90   GFRESTBLACK >90   POTASSIUM 4.3      BP Readings from Last 3 Encounters:   02/18/20 112/62   02/03/20 104/82   03/15/19 100/70    Wt Readings from Last 3 Encounters:   02/18/20 100.2 kg (221 lb)   02/03/20 96.6 kg (213 lb)   03/15/19 97.5 kg (215 lb)           Reviewed and updated as needed this visit  "by Provider         Review of Systems   ROS COMP: Constitutional, HEENT, cardiovascular, pulmonary, gi and gu systems are negative, except as otherwise noted.      Objective    /62   Pulse 88   Temp 97.8  F (36.6  C) (Tympanic)   Ht 1.88 m (6' 2\")   Wt 100.2 kg (221 lb)   SpO2 99%   BMI 28.37 kg/m    Body mass index is 28.37 kg/m .  Physical Exam   GENERAL: healthy, alert and no distress  EYES: Eyes grossly normal to inspection, PERRL and conjunctivae and sclerae normal  HENT: ear canals and TM's normal, nose and mouth without ulcers or lesions  NECK: no adenopathy, no asymmetry, masses, or scars and thyroid normal to palpation  RESP: lungs clear to auscultation - no rales, rhonchi or wheezes  CV: regular rate and rhythm, normal S1 S2, no S3 or S4, no murmur, click or rub, no peripheral edema and peripheral pulses strong  ABDOMEN: soft, nontender, no hepatosplenomegaly, no masses and bowel sounds normal  MS: no gross musculoskeletal defects noted, no edema            Assessment & Plan     1. Mixed hyperlipidemia  Has been stable, will review the lab and update pt   - CBC with platelets  - Comprehensive metabolic panel  - Lipid panel reflex to direct LDL Fasting    2. Elevated glucose  Stable, will keep monitoring   Lab results will be reviewed and updated to pt   - CBC with platelets  - Comprehensive metabolic panel  - Lipid panel reflex to direct LDL Fasting    3. Neck pain  Has been for last 10-15 years with worsening, has multiple level djd, has radiculopathy on right side, will have him to see Dr Dietrich for further evaluation   - PHYSIATRY REFERRAL    4. Elevated TSH  Stable, will review the lab and update pt   - TSH with free T4 reflex    5. Tired  Mentioned above   - TSH with free T4 reflex     Tobacco Cessation:   reports that he has been smoking cigarettes. He has a 20.00 pack-year smoking history. He has never used smokeless tobacco.        BMI:   Estimated body mass index is 28.37 kg/m  as " "calculated from the following:    Height as of this encounter: 1.88 m (6' 2\").    Weight as of this encounter: 100.2 kg (221 lb).           FUTURE APPOINTMENTS:       - Follow-up visit as needed     No follow-ups on file.    Corey Chaidez MD  [facility]      "

## 2020-02-19 LAB
ALBUMIN SERPL-MCNC: 3.8 G/DL (ref 3.4–5)
ALP SERPL-CCNC: 64 U/L (ref 40–150)
ALT SERPL W P-5'-P-CCNC: 28 U/L (ref 0–70)
ANION GAP SERPL CALCULATED.3IONS-SCNC: 3 MMOL/L (ref 3–14)
AST SERPL W P-5'-P-CCNC: 14 U/L (ref 0–45)
BILIRUB SERPL-MCNC: 0.4 MG/DL (ref 0.2–1.3)
BUN SERPL-MCNC: 17 MG/DL (ref 7–30)
CALCIUM SERPL-MCNC: 8.9 MG/DL (ref 8.5–10.1)
CHLORIDE SERPL-SCNC: 107 MMOL/L (ref 94–109)
CHOLEST SERPL-MCNC: 183 MG/DL
CO2 SERPL-SCNC: 29 MMOL/L (ref 20–32)
CREAT SERPL-MCNC: 1.11 MG/DL (ref 0.66–1.25)
GFR SERPL CREATININE-BSD FRML MDRD: 76 ML/MIN/{1.73_M2}
GLUCOSE SERPL-MCNC: 74 MG/DL (ref 70–99)
HDLC SERPL-MCNC: 32 MG/DL
LDLC SERPL CALC-MCNC: 114 MG/DL
NONHDLC SERPL-MCNC: 151 MG/DL
POTASSIUM SERPL-SCNC: 4.4 MMOL/L (ref 3.4–5.3)
PROT SERPL-MCNC: 7.2 G/DL (ref 6.8–8.8)
SODIUM SERPL-SCNC: 139 MMOL/L (ref 133–144)
TRIGL SERPL-MCNC: 186 MG/DL
TSH SERPL DL<=0.005 MIU/L-ACNC: 1.46 MU/L (ref 0.4–4)

## 2020-02-28 ENCOUNTER — TRANSFERRED RECORDS (OUTPATIENT)
Dept: HEALTH INFORMATION MANAGEMENT | Facility: CLINIC | Age: 50
End: 2020-02-28

## 2020-03-13 ENCOUNTER — TRANSFERRED RECORDS (OUTPATIENT)
Dept: HEALTH INFORMATION MANAGEMENT | Facility: CLINIC | Age: 50
End: 2020-03-13

## 2020-03-16 DIAGNOSIS — J45.20 MILD INTERMITTENT ASTHMA WITHOUT COMPLICATION: ICD-10-CM

## 2020-03-16 RX ORDER — ALBUTEROL SULFATE 90 UG/1
2 AEROSOL, METERED RESPIRATORY (INHALATION) EVERY 4 HOURS PRN
Qty: 1 INHALER | Refills: 3 | Status: SHIPPED | OUTPATIENT
Start: 2020-03-16

## 2020-03-16 NOTE — TELEPHONE ENCOUNTER
"Requested Prescriptions   Pending Prescriptions Disp Refills     albuterol (PROAIR HFA/PROVENTIL HFA/VENTOLIN HFA) 108 (90 Base) MCG/ACT inhaler 1 Inhaler 8     Sig: Inhale 2 puffs into the lungs as needed for shortness of breath / dyspnea or wheezing   Last Written Prescription Date:  2/1/19  Last Fill Quantity: 1,  # refills: 8   Last office visit: 2/18/2020 with prescribing provider:     Future Office Visit:        Asthma Maintenance Inhalers - Anticholinergics Failed - 3/16/2020  3:35 PM        Failed - Asthma control assessment score within normal limits in last 6 months     Please review ACT score.           Passed - Patient is age 12 years or older        Passed - Medication is active on med list        Passed - Recent (6 mo) or future (30 days) visit within the authorizing provider's specialty     Patient had office visit in the last 6 months or has a visit in the next 30 days with authorizing provider or within the authorizing provider's specialty.  See \"Patient Info\" tab in inbasket, or \"Choose Columns\" in Meds & Orders section of the refill encounter.           Short-Acting Beta Agonist Inhalers Protocol  Failed - 3/16/2020  3:35 PM        Failed - Asthma control assessment score within normal limits in last 6 months     Please review ACT score.           Passed - Patient is age 12 or older        Passed - Medication is active on med list        Passed - Recent (6 mo) or future (30 days) visit within the authorizing provider's specialty     Patient had office visit in the last 6 months or has a visit in the next 30 days with authorizing provider or within the authorizing provider's specialty.  See \"Patient Info\" tab in inbasket, or \"Choose Columns\" in Meds & Orders section of the refill encounter.                 "

## 2020-03-16 NOTE — TELEPHONE ENCOUNTER
Reason for Call:  Medication or medication refill:    Do you use a [facility]?  Name of the pharmacy and phone number for the current request:  [facility] - [phone]    Name of the medication requested: albuterol ventolin inhaler    Pt also requesting an albuterol nebulizer fluid, not listed on med list so cannot pend. Thanks!    Other request: n/a    Can we leave a detailed message on this number? YES    Phone number patient can be reached at: Cell number on file:    Telephone Information:   Mobile [phone]       Best Time: any    Call taken on 3/16/2020 at 3:34 PM by Yuly Moscoso

## 2020-03-17 ENCOUNTER — TRANSFERRED RECORDS (OUTPATIENT)
Dept: HEALTH INFORMATION MANAGEMENT | Facility: CLINIC | Age: 50
End: 2020-03-17

## 2020-03-17 DIAGNOSIS — J45.20 MILD INTERMITTENT ASTHMA WITHOUT COMPLICATION: Primary | ICD-10-CM

## 2020-03-17 RX ORDER — ALBUTEROL SULFATE 0.83 MG/ML
2.5 SOLUTION RESPIRATORY (INHALATION) EVERY 4 HOURS PRN
Qty: 60 ML | Refills: 3 | Status: SHIPPED | OUTPATIENT
Start: 2020-03-17

## 2020-03-17 NOTE — TELEPHONE ENCOUNTER
Patient states that he has not need albuterol sulfate nebulizer (0.083% solution) since he has been in [place]. (Previously lived in [place]) States that if he gets a cold or flu he usually ends up needing a nebulizer.   All of the neb solution he has on hand is .  States that he wants to be prepared if he has to do a self quarantine.   Med and pharmacy pended.  Scarlett Mercedes RN

## 2020-03-25 ENCOUNTER — VIRTUAL VISIT (OUTPATIENT)
Dept: FAMILY MEDICINE | Facility: OTHER | Age: 50
End: 2020-03-25

## 2020-03-25 NOTE — PROGRESS NOTES
"Date: 2020 18:08:46  Clinician: Lev Marshall  Clinician NPI: [id number]  Patient: Tyler Gonzalez  Patient : 1970  [facility] [place] [address]  Patient Phone: [phone]  Visit Protocol: Oral mouth sores  Patient Summary:  Tyler is a 50 year old ( : 1970 ) male who initiated a Visit for canker sores. When asked the question \"Please sign me up to receive news, health information and promotions. \", Tyler responded \"No\".    Images of his skin condition were uploaded.   Symptom details  His symptoms started 4 to 6 days ago. The symptoms consist of pain and burning.   The sores are located inside the mouth on both sides of the face. He has more than 5 sores.   Tyler denies having tingling around the affected area. He also denies feeling feverish. His sores are not located in a cluster. Tyler is not experiencing similar rash or sores on other parts of the body.   Precipitating events  Tyler did not experience pain or unusual sensations (such as itching, burning, or tingling) in the location of sores/rash before it appeared.   Pertinent medical history  His last outbreak was 1 to 3 months ago. He has had 1 outbreak(s) in the past three months. His sores are typically recurrent.    Tyler does not need a return to work/school note.   Tyler smokes or uses smokeless tobacco.   Additional information as reported by the patient (free text): i believe i have trush in my mouth and sore throat with red spots on the roof of my mouth and throat     MEDICATIONS: Ventolin HFA inhalation, pantoprazole oral, ALLERGIES: Penicillins  Clinician Response:  Dear Tyler,  Based on the information you have provided, your lesions are consistent with canker sores, also known as aphthous ulcers. The cause of canker sores is not certain, but scientists suggest that the sores could be triggered by minor injury to the skin inside the mouth, food sensitivities, stress, or other " medical conditions.  These open mouth sores typically appear inside your mouth, on the inside of your cheeks, under your tongue, or at the base of your gums. These sores are often painful but usually go away in 7-10 days.   Medication information  I am prescribing:     Triamcinolone acetonide (Oralone) 0.1 % dental paste. Apply a small amount to the affected area 3 times a day after meals for up to 7 days. There are no refills with this prescription.   Unless you are allergic to the over-the-counter medication(s) below, I recommend using:       Acetaminophen (Tylenol or store brand). Take 1-2 tablets every 4-6 hours as needed to help with the pain.      Ibuprofen (Advil or store brand) 200 mg oral tablet. Take 1-3 tablets (200-600 mg) by mouth every 8 hours to control pain. Make sure to take the ibuprofen with food. Do not exceed 2400 mg in 24 hours.     Over-the-counter medications do not require a prescription. Ask the pharmacist if you have any questions.  Self care  Steps you can take to be as comfortable as possible:     Avoid acidic or spicy foods    Drink cold liquids such as water, milk shakes    Apply ice to the canker sores to reduce the pain    Use a soft toothbrush to brush the teeth gently     Becoming tobacco-free is one of the best things you can do to improve your health. For help with quitting tobacco, you can call 4-607-QUIT-NOW (1-107.510.4064) or visit smokefree.gov.  When to seek care  Please make an appointment to be seen in a clinic or urgent care if any of the following occur:     Your symptoms do not improve within 10-14 days    You develop new symptoms or your symptoms become worse     Additional treatment plan   Your red spots look like they are likely a viral infection. I do not see any evidence of thrush in the pictures that you provided.&nbsp;  I sent in a steroid paste which helps the sores heal. I also sent in lidocaine that you can swish and spit to help with pain.  If not improving  as expected (in 1-2 weeks), you can submit a new visit.    Diagnosis: Recurrent oral aphthae  Diagnosis ICD: K12.0  Prescription: triamcinolone acetonide (Oralone) 0.1 % dental paste 1 5 gram tube, 7 days supply. Apply a small amount to the affected area 3 times a day after meals for up to 7 days. Refills: 0, Refill as needed: no, Allow substitutions: yes  Pharmacy: [id number] - [phone] - [id number] [phone]

## 2020-04-01 ENCOUNTER — TRANSFERRED RECORDS (OUTPATIENT)
Dept: HEALTH INFORMATION MANAGEMENT | Facility: CLINIC | Age: 50
End: 2020-04-01

## 2021-03-01 ENCOUNTER — TELEPHONE (OUTPATIENT)
Dept: FAMILY MEDICINE | Facility: CLINIC | Age: 51
End: 2021-03-01

## 2021-03-01 NOTE — TELEPHONE ENCOUNTER
Rcvd fax from [facility] in [place], [place] in regards to inhaler rxs.  Per fax, they have spoken with patient about asthma care and noticed that he has multiple rescue inhaler fills without filling a controller medication at [facility] pharmacy in the last 180 days.  They are reaching out on behalf of the patient to determine of it is appropriate to start a daily asthma controller therapy.  Please send rx if appropriate.    Deja Plasencia

## (undated) RX ORDER — FENTANYL CITRATE 50 UG/ML
INJECTION, SOLUTION INTRAMUSCULAR; INTRAVENOUS
Status: DISPENSED
Start: 2019-02-28